# Patient Record
Sex: FEMALE | Race: BLACK OR AFRICAN AMERICAN | NOT HISPANIC OR LATINO | URBAN - NONMETROPOLITAN AREA
[De-identification: names, ages, dates, MRNs, and addresses within clinical notes are randomized per-mention and may not be internally consistent; named-entity substitution may affect disease eponyms.]

---

## 2019-09-06 NOTE — PROCEDURE NOTE: CLINICAL
PROCEDURE NOTE: Punctal Plugs, Silicone #2 Prior to treatment, the risks/benefits/alternatives were discussed. The patient wished to proceed with procedure. Permanent silicone plugs were inserted. Patient tolerated procedure well. There were no complications. Post procedure instructions given. Size .5mm. Magalis Orozco

## 2019-12-11 NOTE — PATIENT DISCUSSION
Continue taking AREDS 2 vitamins. [facility] faxed request to refill furosemide 20mg.

## 2021-12-07 ENCOUNTER — IMPORTED ENCOUNTER (OUTPATIENT)
Dept: URBAN - NONMETROPOLITAN AREA CLINIC 1 | Facility: CLINIC | Age: 81
End: 2021-12-07

## 2021-12-07 ENCOUNTER — PREPPED CHART (OUTPATIENT)
Dept: RURAL CLINIC 3 | Facility: CLINIC | Age: 81
End: 2021-12-07

## 2021-12-07 PROBLEM — H52.4: Noted: 2021-12-07

## 2021-12-07 PROBLEM — E11.9: Noted: 2021-12-07

## 2021-12-07 PROBLEM — H35.033: Noted: 2021-12-07

## 2021-12-07 PROBLEM — H25.13: Noted: 2021-12-07

## 2021-12-07 PROCEDURE — S0620 ROUTINE OPHTHALMOLOGICAL EXA: HCPCS

## 2021-12-07 NOTE — PATIENT DISCUSSION
Presbyopia OU Discussed refractive status in detail with patient. MR done today but do not recommend updating due to cataract progression. Continue to monitor. NIDDM s  OU Discussed diagnosis with patient. Discussed the risk of diabetic damage of the retina with potential vision loss and the importance of routine follow-up. Emphasized strict blood sugar control. Continue to monitor. [facility] OU Discussed diagnosis with patient. Reviewed symptoms related to cataract progression. Discussed various treatment options with patient. Recommend cataract evaluation by . Patient elects to schedule. HTN Retinopathy OU Discussed diagnosis in detail with patient. Discussed hypertension with patient and stressed the importance of control. Continue to monitor.

## 2021-12-07 NOTE — PATIENT DISCUSSION
Presbyopia OUDiscussed refractive status in detail with patient. MR done today but do not recommend updating due to cataract progression. Continue to monitor. NIDDM s DR OUDiscussed diagnosis with patient. Discussed the risk of diabetic damage of the retina with potential vision loss and the importance of routine follow-up. Emphasized strict blood sugar control. Continue to monitor. Tesha OUDiscussed diagnosis with patient. Reviewed symptoms related to cataract progression. Discussed various treatment options with patient. Recommend cataract evaluation by Dr. Lotus Leyva. Patient elects to schedule. HTN Retinopathy OUDiscussed diagnosis in detail with patient. Discussed hypertension with patient and stressed the importance of control. Continue to monitor.

## 2022-03-18 ASSESSMENT — VISUAL ACUITY
OS_PH: 20/30
OS_SC: 20/40
OD_SC: 20/30+2

## 2022-03-18 ASSESSMENT — TONOMETRY
OD_IOP_MMHG: 16
OS_IOP_MMHG: 16

## 2022-03-21 ENCOUNTER — CONSULTATION/EVALUATION (OUTPATIENT)
Dept: RURAL CLINIC 3 | Facility: CLINIC | Age: 82
End: 2022-03-21

## 2022-03-21 VITALS
DIASTOLIC BLOOD PRESSURE: 50 MMHG | SYSTOLIC BLOOD PRESSURE: 121 MMHG | BODY MASS INDEX: 37.22 KG/M2 | WEIGHT: 218 LBS | HEART RATE: 81 BPM | HEIGHT: 64 IN

## 2022-03-21 DIAGNOSIS — H25.13: ICD-10-CM

## 2022-03-21 PROCEDURE — 99214 OFFICE O/P EST MOD 30 MIN: CPT

## 2022-03-21 ASSESSMENT — VISUAL ACUITY
OD_SC: 20/40+2
OD_BAT: 20/40
OS_AM: 20/20-2
OS_SC: 20/60
OD_CC: 20/25
OS_PH: 20/40+1
OD_PH: 20/30-2
OS_CC: 20/30
OS_BAT: 20/60
OD_PAM: 20/20

## 2022-03-21 ASSESSMENT — TONOMETRY
OS_IOP_MMHG: 19
OD_IOP_MMHG: 20

## 2022-03-21 NOTE — PATIENT DISCUSSION
(Surgical) Visually Significant (secondary to glare), discussed the risks, benefits, alternatives, and limitations of cataract surgery. The patient stated a full understanding and a desire to proceed with the procedure. The patient will need to return for pre-op appointment with cataract measurements and to have any additional questions answered, and start pre-operative eye drops as directed. Pt elects to proceed with Stand/Trad OU starting with OS first and then OD after. Discussed need for bifocals s/p surgery. Dextenza OU +.

## 2022-03-21 NOTE — PATIENT DISCUSSION
Presbyopia OU Discussed refractive status in detail with patient. MR done today but do not recommend updating due to cataract progression. Continue to monitor. NIDDM s  OU Discussed diagnosis with patient. Discussed the risk of diabetic damage of the retina with potential vision loss and the importance of routine follow-up. Emphasized strict blood sugar control. Continue to monitor. Tesha OU Discussed diagnosis with patient. Reviewed symptoms related to cataract progression. Discussed various treatment options with patient. Recommend cataract evaluation by . Patient elects to schedule. HTN Retinopathy OU Discussed diagnosis in detail with patient. Discussed hypertension with patient and stressed the importance of control. Continue to monitor.

## 2022-04-08 ENCOUNTER — SURGERY/PROCEDURE (OUTPATIENT)
Dept: RURAL CLINIC 4 | Facility: CLINIC | Age: 82
End: 2022-04-08

## 2022-04-08 ENCOUNTER — POST OP/EVAL OF SECOND EYE (OUTPATIENT)
Dept: RURAL CLINIC 4 | Facility: CLINIC | Age: 82
End: 2022-04-08

## 2022-04-08 VITALS
BODY MASS INDEX: 37.73 KG/M2 | WEIGHT: 221 LBS | HEIGHT: 64 IN | HEART RATE: 54 BPM | DIASTOLIC BLOOD PRESSURE: 76 MMHG | SYSTOLIC BLOOD PRESSURE: 171 MMHG

## 2022-04-08 DIAGNOSIS — H25.12: ICD-10-CM

## 2022-04-08 DIAGNOSIS — Z01.818: ICD-10-CM

## 2022-04-08 DIAGNOSIS — H25.11: ICD-10-CM

## 2022-04-08 PROCEDURE — 99024 POSTOP FOLLOW-UP VISIT: CPT

## 2022-04-08 PROCEDURE — 68841 INSJ RX ELUT IMPLT LAC CANAL: CPT

## 2022-04-08 PROCEDURE — 66984 XCAPSL CTRC RMVL W/O ECP: CPT

## 2022-04-08 ASSESSMENT — VISUAL ACUITY
OS_SC: 20/40
OD_PAM: 20/20
OD_SC: 20/40+1
OD_BAT: 20/40
OD_CC: 20/25
OD_PH: 20/30-2

## 2022-04-08 ASSESSMENT — TONOMETRY
OD_IOP_MMHG: 32
OS_IOP_MMHG: 18

## 2022-04-08 NOTE — PATIENT DISCUSSION
Medical Clearance    -Medical clearance done today.   -No outstanding concerns that would preclude surgery.   -Patient is cleared to proceed with scheduled surgery. small murmur.

## 2022-04-09 ASSESSMENT — VISUAL ACUITY
OS_PH: 20/30
OD_CC: 20/30+2
OS_CC: 20/40

## 2022-04-09 ASSESSMENT — TONOMETRY
OS_IOP_MMHG: 16
OD_IOP_MMHG: 16

## 2022-04-12 ENCOUNTER — POST-OP (OUTPATIENT)
Dept: RURAL CLINIC 3 | Facility: CLINIC | Age: 82
End: 2022-04-12

## 2022-04-12 DIAGNOSIS — Z96.1: ICD-10-CM

## 2022-04-12 PROCEDURE — 99024 POSTOP FOLLOW-UP VISIT: CPT

## 2022-04-12 ASSESSMENT — TONOMETRY
OS_IOP_MMHG: 18
OD_IOP_MMHG: 20

## 2022-04-12 ASSESSMENT — VISUAL ACUITY
OS_SC: 20/30
OD_SC: 20/30

## 2022-04-22 ENCOUNTER — POST-OP (OUTPATIENT)
Dept: RURAL CLINIC 3 | Facility: CLINIC | Age: 82
End: 2022-04-22

## 2022-04-22 ENCOUNTER — SURGERY/PROCEDURE (OUTPATIENT)
Dept: RURAL CLINIC 4 | Facility: CLINIC | Age: 82
End: 2022-04-22

## 2022-04-22 DIAGNOSIS — Z96.1: ICD-10-CM

## 2022-04-22 DIAGNOSIS — H25.11: ICD-10-CM

## 2022-04-22 PROCEDURE — 66984 XCAPSL CTRC RMVL W/O ECP: CPT

## 2022-04-22 PROCEDURE — 99024 POSTOP FOLLOW-UP VISIT: CPT

## 2022-04-22 ASSESSMENT — TONOMETRY
OD_IOP_MMHG: 21
OS_IOP_MMHG: 17

## 2022-04-22 ASSESSMENT — VISUAL ACUITY
OD_PH: 20/40
OS_SC: 20/20
OD_SC: 20/60

## 2022-04-27 ENCOUNTER — POST-OP (OUTPATIENT)
Dept: RURAL CLINIC 3 | Facility: CLINIC | Age: 82
End: 2022-04-27

## 2022-04-27 DIAGNOSIS — Z96.1: ICD-10-CM

## 2022-04-27 PROCEDURE — 99024 POSTOP FOLLOW-UP VISIT: CPT

## 2022-04-27 ASSESSMENT — TONOMETRY
OS_IOP_MMHG: 15
OD_IOP_MMHG: 15

## 2022-04-27 ASSESSMENT — VISUAL ACUITY
OD_SC: 20/20
OS_SC: 20/25

## 2022-06-01 ENCOUNTER — POST-OP (OUTPATIENT)
Dept: RURAL CLINIC 3 | Facility: CLINIC | Age: 82
End: 2022-06-01

## 2022-06-01 DIAGNOSIS — H52.4: ICD-10-CM

## 2022-06-01 DIAGNOSIS — Z96.1: ICD-10-CM

## 2022-06-01 PROCEDURE — 92015 DETERMINE REFRACTIVE STATE: CPT

## 2022-06-01 PROCEDURE — 99024 POSTOP FOLLOW-UP VISIT: CPT

## 2022-06-01 ASSESSMENT — TONOMETRY
OD_IOP_MMHG: 16
OS_IOP_MMHG: 16

## 2022-06-01 ASSESSMENT — VISUAL ACUITY
OD_SC: 20/30
OS_SC: 20/20-1

## 2022-09-13 ENCOUNTER — FOLLOW UP (OUTPATIENT)
Dept: RURAL CLINIC 3 | Facility: CLINIC | Age: 82
End: 2022-09-13

## 2022-09-13 DIAGNOSIS — Z96.1: ICD-10-CM

## 2022-09-13 PROCEDURE — 99213 OFFICE O/P EST LOW 20 MIN: CPT

## 2022-09-13 ASSESSMENT — VISUAL ACUITY
OS_SC: 20/20
OD_SC: 20/20-1

## 2022-09-13 ASSESSMENT — TONOMETRY
OS_IOP_MMHG: 13
OD_IOP_MMHG: 13

## 2023-10-05 ENCOUNTER — FOLLOW UP (OUTPATIENT)
Dept: RURAL CLINIC 3 | Facility: CLINIC | Age: 83
End: 2023-10-05

## 2023-10-05 DIAGNOSIS — E11.9: ICD-10-CM

## 2023-10-05 DIAGNOSIS — H52.4: ICD-10-CM

## 2023-10-05 PROCEDURE — 99214 OFFICE O/P EST MOD 30 MIN: CPT

## 2023-10-05 PROCEDURE — 92250 FUNDUS PHOTOGRAPHY W/I&R: CPT

## 2023-10-05 PROCEDURE — 92015 DETERMINE REFRACTIVE STATE: CPT

## 2023-10-05 ASSESSMENT — TONOMETRY
OD_IOP_MMHG: 18
OS_IOP_MMHG: 18

## 2023-10-05 ASSESSMENT — VISUAL ACUITY
OD_SC: 20/20
OS_SC: 20/20

## 2024-10-09 ENCOUNTER — COMPREHENSIVE EXAM (OUTPATIENT)
Dept: RURAL CLINIC 3 | Facility: CLINIC | Age: 84
End: 2024-10-09

## 2024-10-09 DIAGNOSIS — H16.223: ICD-10-CM

## 2024-10-09 DIAGNOSIS — Z96.1: ICD-10-CM

## 2024-10-09 DIAGNOSIS — E11.9: ICD-10-CM

## 2024-10-09 PROCEDURE — 92250 FUNDUS PHOTOGRAPHY W/I&R: CPT

## 2024-10-09 PROCEDURE — 99214 OFFICE O/P EST MOD 30 MIN: CPT

## 2025-04-10 NOTE — PATIENT DISCUSSION
Discussed AREDS 2 supplements, UV protection.
Discussed condition and exacerbating conditions/situations (e.g., dry/arid environments, overhead fans, air conditioners, side effect of medications).
Discussed lid hygiene, warm compress and eyelid wash.
Glasses Prescription given to patient.
Monitor.
No Retinal holes, Tears or Detachments.
Patient education on limited vest corrected vision secondary to macular degeneration.
Patient understands condition, prognosis and need for follow up care.
Recommended artificial tears to use: 1 drop 4x a day in both eyes.
Retinal tear and detachment warning symptoms reviewed and patient instructed to call immediately if increasing floaters, flashes, or decreasing peripheral vision.
see #2.
Influenza Vaccination